# Patient Record
Sex: FEMALE | Race: AMERICAN INDIAN OR ALASKA NATIVE
[De-identification: names, ages, dates, MRNs, and addresses within clinical notes are randomized per-mention and may not be internally consistent; named-entity substitution may affect disease eponyms.]

---

## 2018-07-26 ENCOUNTER — HOSPITAL ENCOUNTER (OUTPATIENT)
Dept: HOSPITAL 5 - SLR | Age: 28
Discharge: HOME | End: 2018-07-26
Attending: OTOLARYNGOLOGY
Payer: MEDICAID

## 2018-07-26 DIAGNOSIS — G47.33: Primary | ICD-10-CM

## 2018-07-26 PROCEDURE — 95810 POLYSOM 6/> YRS 4/> PARAM: CPT

## 2018-09-18 ENCOUNTER — HOSPITAL ENCOUNTER (OUTPATIENT)
Dept: HOSPITAL 5 - SLR | Age: 28
Discharge: HOME | End: 2018-09-18
Attending: OTOLARYNGOLOGY
Payer: MEDICAID

## 2018-09-18 DIAGNOSIS — G47.33: ICD-10-CM

## 2018-09-18 DIAGNOSIS — R40.0: Primary | ICD-10-CM

## 2018-09-18 DIAGNOSIS — R06.83: ICD-10-CM

## 2018-09-18 PROCEDURE — 95811 POLYSOM 6/>YRS CPAP 4/> PARM: CPT

## 2019-03-28 ENCOUNTER — HOSPITAL ENCOUNTER (OUTPATIENT)
Dept: HOSPITAL 5 - TRG | Age: 29
LOS: 1 days | Discharge: HOME | End: 2019-03-29
Attending: OBSTETRICS & GYNECOLOGY
Payer: MEDICAID

## 2019-03-28 DIAGNOSIS — O99.333: ICD-10-CM

## 2019-03-28 DIAGNOSIS — F17.200: ICD-10-CM

## 2019-03-28 DIAGNOSIS — O24.913: ICD-10-CM

## 2019-03-28 DIAGNOSIS — F32.9: ICD-10-CM

## 2019-03-28 DIAGNOSIS — M79.89: ICD-10-CM

## 2019-03-28 DIAGNOSIS — O99.513: ICD-10-CM

## 2019-03-28 DIAGNOSIS — O26.893: Primary | ICD-10-CM

## 2019-03-28 DIAGNOSIS — Z3A.34: ICD-10-CM

## 2019-03-28 DIAGNOSIS — O99.343: ICD-10-CM

## 2019-03-28 DIAGNOSIS — F12.90: ICD-10-CM

## 2019-03-28 DIAGNOSIS — O99.323: ICD-10-CM

## 2019-03-28 LAB
BACTERIA #/AREA URNS HPF: (no result) /HPF
BILIRUB UR QL STRIP: (no result)
BLOOD UR QL VISUAL: (no result)
MUCOUS THREADS #/AREA URNS HPF: (no result) /HPF
PH UR STRIP: 7 [PH] (ref 5–7)
PROT UR STRIP-MCNC: (no result) MG/DL
RBC #/AREA URNS HPF: 2 /HPF (ref 0–6)
UROBILINOGEN UR-MCNC: 2 MG/DL (ref ?–2)
WBC #/AREA URNS HPF: 5 /HPF (ref 0–6)

## 2019-03-28 PROCEDURE — 82962 GLUCOSE BLOOD TEST: CPT

## 2019-03-28 PROCEDURE — 96360 HYDRATION IV INFUSION INIT: CPT

## 2019-03-28 PROCEDURE — 59025 FETAL NON-STRESS TEST: CPT

## 2019-03-28 PROCEDURE — 81001 URINALYSIS AUTO W/SCOPE: CPT

## 2019-03-29 VITALS — DIASTOLIC BLOOD PRESSURE: 85 MMHG | SYSTOLIC BLOOD PRESSURE: 137 MMHG

## 2019-04-15 ENCOUNTER — HOSPITAL ENCOUNTER (INPATIENT)
Dept: HOSPITAL 5 - TRG | Age: 29
LOS: 3 days | Discharge: HOME | End: 2019-04-18
Attending: OBSTETRICS & GYNECOLOGY | Admitting: OBSTETRICS & GYNECOLOGY
Payer: MEDICAID

## 2019-04-15 DIAGNOSIS — Z82.49: ICD-10-CM

## 2019-04-15 DIAGNOSIS — O32.9XX0: ICD-10-CM

## 2019-04-15 DIAGNOSIS — Z79.4: ICD-10-CM

## 2019-04-15 DIAGNOSIS — Z3A.37: ICD-10-CM

## 2019-04-15 DIAGNOSIS — Z23: ICD-10-CM

## 2019-04-15 DIAGNOSIS — E66.01: ICD-10-CM

## 2019-04-15 LAB
ALT SERPL-CCNC: 11 UNITS/L (ref 7–56)
BACTERIA #/AREA URNS HPF: (no result) /HPF
BILIRUB UR QL STRIP: (no result)
BLOOD UR QL VISUAL: (no result)
HCT VFR BLD CALC: 34.3 % (ref 30.3–42.9)
HGB BLD-MCNC: 12.2 GM/DL (ref 10.1–14.3)
MCHC RBC AUTO-ENTMCNC: 36 % (ref 30–34)
MCV RBC AUTO: 88 FL (ref 79–97)
PH UR STRIP: 7 [PH] (ref 5–7)
PLATELET # BLD: 308 K/MM3 (ref 140–440)
PROT UR STRIP-MCNC: (no result) MG/DL
RBC # BLD AUTO: 3.89 M/MM3 (ref 3.65–5.03)
RBC #/AREA URNS HPF: 1 /HPF (ref 0–6)
URATE SERPL-MCNC: 5.6 MG/DL (ref 3.5–7.6)
UROBILINOGEN UR-MCNC: < 2 MG/DL (ref ?–2)
WBC #/AREA URNS HPF: 2 /HPF (ref 0–6)

## 2019-04-15 PROCEDURE — 82962 GLUCOSE BLOOD TEST: CPT

## 2019-04-15 PROCEDURE — 82565 ASSAY OF CREATININE: CPT

## 2019-04-15 PROCEDURE — 88307 TISSUE EXAM BY PATHOLOGIST: CPT

## 2019-04-15 PROCEDURE — 86592 SYPHILIS TEST NON-TREP QUAL: CPT

## 2019-04-15 PROCEDURE — 81001 URINALYSIS AUTO W/SCOPE: CPT

## 2019-04-15 PROCEDURE — 85014 HEMATOCRIT: CPT

## 2019-04-15 PROCEDURE — 90707 MMR VACCINE SC: CPT

## 2019-04-15 PROCEDURE — 84450 TRANSFERASE (AST) (SGOT): CPT

## 2019-04-15 PROCEDURE — 86850 RBC ANTIBODY SCREEN: CPT

## 2019-04-15 PROCEDURE — 86901 BLOOD TYPING SEROLOGIC RH(D): CPT

## 2019-04-15 PROCEDURE — 85018 HEMOGLOBIN: CPT

## 2019-04-15 PROCEDURE — 86900 BLOOD TYPING SEROLOGIC ABO: CPT

## 2019-04-15 PROCEDURE — 83735 ASSAY OF MAGNESIUM: CPT

## 2019-04-15 PROCEDURE — 84460 ALANINE AMINO (ALT) (SGPT): CPT

## 2019-04-15 PROCEDURE — 90715 TDAP VACCINE 7 YRS/> IM: CPT

## 2019-04-15 PROCEDURE — C1765 ADHESION BARRIER: HCPCS

## 2019-04-15 PROCEDURE — 83615 LACTATE (LD) (LDH) ENZYME: CPT

## 2019-04-15 PROCEDURE — 84550 ASSAY OF BLOOD/URIC ACID: CPT

## 2019-04-15 PROCEDURE — 90472 IMMUNIZATION ADMIN EACH ADD: CPT

## 2019-04-15 PROCEDURE — 36415 COLL VENOUS BLD VENIPUNCTURE: CPT

## 2019-04-15 PROCEDURE — 85027 COMPLETE CBC AUTOMATED: CPT

## 2019-04-15 NOTE — PROCEDURE NOTE
OB Delivery Note





- Delivery


Date of Delivery: 04/15/19


Surgeon: GAYLA GAYTAN


Estimated blood loss: 1000cc





-  Section


Preop diagnosis: breech, other (Severe Preeclampsia )


Postop diagnosis: same


 section procedure:  section, primary low transverse


Disposition: PACU


Complications: none


Narrative: 





Please see operative report.





- Infant


  ** A


Apgar at 1 minute: 8


Apgar at 5 minutes: 9


Infant Gender: Female (3585g (7lb 14 oz) @ 2210 pm)

## 2019-04-15 NOTE — HISTORY AND PHYSICAL REPORT
History of Present Illness


Date of examination: 04/15/19


Chief complaint: 





sent from [facility] clinic for delivery 


History of present illness: 





Pt is a 28 year old -American female primigravida KACEY  19 at 37w0d 

presents from maternal fetal medicine clinic with elevated blood pressures with 

instruction for delivery. The patient reports headaches for the past few days 

but denies blurry vision. She has had prenatal care at [facility] 

since 6 wks complicated by Type II Diabetes Mellitus on Insulin ([id number] AM, 22 R

dinner, 20 NPH QHS), Sleep apnea, Morbid Obesity, genital herpes without lesion 

or prodrome, malpresentation noted on multiple ultrasounds and GBS positive 

status.  





Past History


Past Medical History: diabetes, other (morbid obesity, obstructive sleep apnea )


Past Surgical History: no surgical history


GYN History: herpes


Family/Genetic History: hypertension


Social history: no significant social history





- Obstetrical History


Expected Date of Delivery: 19


Actual Gestation: 37 Week(s) 0 Day(s) 


: 1





Medications and Allergies


                                    Allergies











Allergy/AdvReac Type Severity Reaction Status Date / Time


 


No Known Allergies Allergy   Verified 19 22:48











Active Meds: 


Active Medications





Citric Acid/Sodium Citrate (Bicitra)  30 ml PO ONCE ONE


   Stop: 04/15/19 19:47


Famotidine (Pepcid)  20 mg IV ONCE ONE


   Stop: 04/15/19 19:47


Cefazolin Sodium 3 gm/ Sodium (Chloride)  100 mls @ 100 mls/30 min IV PREOP NR; 

Protocol


Lactated Ringer's (Lactated Ringers)  1,000 mls @ 2,250 mls/hr IV PREOP BRISEIDA


   Stop: 19 20:27


Oxytocin/Sodium Chloride (Pitocin/Ns 20 Unit/1000ml Drip)  20 units in 1,000 mls

@ 0 mls/hr IV TITR BRISEIDA


Metoclopramide HCl (Reglan)  10 mg IV ONCE ONE


   Stop: 04/15/19 19:47











Review of Systems


All systems: negative





- Vital Signs


Vital signs: 


                                   Vital Signs











Pulse BP


 


 115 H  197/112 


 


 04/15/19 19:21  04/15/19 19:21








                                        











Temp Pulse Resp BP Pulse Ox


 


 98.1 F   85   18   144/68    


 


 04/15/19 19:32  04/15/19 19:42  04/15/19 19:32  04/15/19 19:42   














- Physical Exam


Abdomen: Positive: soft (obese, gravid )


Uterus: Positive: enlarged (gravid)


Extremities: Positive: edema





- Obstetrical


FHR: auscultation normal


Uterine Contraction Monitor Mode: External


Uterine Contraction Pattern: Absent





Results


Result Diagrams: 


                                 04/15/19 20:12





                                 04/15/19 20:12


All other labs normal.








Assessment and Plan





A:  IUP at 37w0d


     Severe Preeclampsia


     Malpresentation


     Insulin Dependent Diabetes Mellitus 


     Morbid Obesity


     GBS positive 


     Genital Herpes without lesion or prodrome





P: Proceed with primary  section and other indicated procedures.

## 2019-04-15 NOTE — OPERATIVE REPORT
Operative Report


Operative Report: 





Date of procedure: April 15, 2019


Preoperative diagnosis: 1)IUP at 37w0d 2) Severe Preeclampsia 3) Malpresentation

4) Morbid Obesity 5) Diabetes Mellitus 


Postoperative diagnosis: Same


Procedure: Primary low transverse  section 


Surgeon: Sylwia Eric M.D.


Anesthesia: Regional


Findings:


1) Viable female , Apgars 8 and 9, weight 3585g, (7 lb 14 oz) in complete

breech presentation. Meconium at time of delivery


2) Normal-appearing uterus ovaries and tubes


Estimated blood loss: 1000 mL 


IV fluids:1900 mL 


Urine output: 150 mL, clear at the end of the procedure 


Drains: Pitts to gravity


Specimens: Placenta to pathology 


Complications: None. Counts correct x 3 


Disposition: Stable to PACU





Indication for procedure:


Patient is a 28-year-old -American female primigravida at 37 weeks 0 days

who presents with severe preeclampsia and  malpresentation.  The decision was 

made to proceed with  delivery.





Operation in detail: After the risks, benefits, alternatives and complications 

were explained to the patient she gave informed consent for the procedure.  She 

was subsequently taken to the operating room where regional anesthesia was noted

to be adequate.  She was subsequently placed in the dorsal supine position with 

leftward tilt and prepped and draped in a normal sterile fashion.  Fetal heart 

tones were noted to be in the 150s prior to incision.  A timeout was performed.





A Pfannenstiel skin incision was made with the knife and carried down to the 

layer of the fascia with the Bovie.  The fascia was incised in the midline and 

the fascial incision was extended bilaterally with the Bovie.  Attention was 

then turned to the superior aspect of the incision which was grasped with two 

Kochers, tented up, and dissected off the rectus muscles.  Attention was then 

turned to the inferior aspect of the incision which was grasped with two 

Kochers, tented up and dissected off the rectus muscles.  The rectus muscles 

were then  in the midline and partially transected for adequate 

visualization.  The peritoneum was then entered bluntly. The peritoneal incision

was extended with good visualization of the bladder.  The peritoneal incision 

was then stretched.  An Martir self-retaining retractor was placed for 

visualization.  The bladder blade was placed.





The vesicouterine peritoneum was grasped with smooth pickups and incised with 

Metzenbaum scissors.  Metzenbaum scissors were used to extend the incision 

bilaterally.  The bladder flap was then created digitally and the bladder blade 

was replaced.  A transverse incision was made in the lower uterine segment with 

a knife and extended bilaterally with the bandage scissors.  The fetal buttocks 

were delivered, followed by the fetal legs up to the level of the sacrum, then 

the fetus was covered with a blue towel. Next the torso was delivered, then each

arm along the axis of the body and finally the fetal head.  was bulb 

suctioned at delivery.  The cord was clamped and cut and the  was handed 

to NICU staff in attendance.  Cord blood was collected.  The placenta was then 

delivered manually.  





The uterus was then cleared of all clots and debris.  The hysterotomy was then 

reapproximated with 0 Vicryl in a running locked fashion.  A second layer of the

same suture was used in imbricating fashion.  The hysterotomy was inspected and 

hemostasis was noted.  The Martir self-retaining retractor was removed.  The 

gutters were irrigated and cleared of all clots and debris.  The hysterotomy was

again inspected and noted to be hemostatic. Surgicel was placed over the 

hysterotomy. Interceed was placed over the anterior surface of the uterus. The 

peritoneum was reapproximated with 2-0 Vicryl in a running fashion incorporating

the rectus muscles.  The fascia was reapproximated with 0 Vicryl in a running 

fashion.  The subcutaneous tissue was reapproximated with 3-0 Vicryl in a 

running fashion.  The skin was reapproximated with 4-0 Vicryl in a subcuticular 

fashion.  The incision was then covered with steri strips and a pressure 

dressing. The procedure was then ended.  The patient tolerated the procedure 

well and was taken to the PACU in stable condition.  All instrument, lap, and 

needle counts were correct 3.

## 2019-04-15 NOTE — POST ANESTHESIA EVALUATION
- Post Anesthesia Evaluation


Patient Participated: Yes


Airway Patent: Yes


Stable Respiratory Function: Yes


Nausea/Vomiting: No


Temp > 96.8F: Yes


Pain Manageable: Yes


Adequeate Hydration: Yes


Anesthesia Complications: No


Block Receding Appropriately: Yes


Patient on Ventilator: No


Other Comments: Report to PACU HONG Martinez. Explained pt history of JOSHUA and 

necessity of caution with opioid pain medications. Order to be placed for 

continuous SaO2 on floor if not already placed by OBGYN.  Explained spinal 

regression to patient who verbalizes understanding. VSS.

## 2019-04-16 LAB
HCT VFR BLD CALC: 34.6 % (ref 30.3–42.9)
HGB BLD-MCNC: 11.6 GM/DL (ref 10.1–14.3)

## 2019-04-16 PROCEDURE — 3E0234Z INTRODUCTION OF SERUM, TOXOID AND VACCINE INTO MUSCLE, PERCUTANEOUS APPROACH: ICD-10-PCS | Performed by: OBSTETRICS & GYNECOLOGY

## 2019-04-16 RX ADMIN — KETOROLAC TROMETHAMINE PRN MG: 30 INJECTION, SOLUTION INTRAMUSCULAR at 08:00

## 2019-04-16 RX ADMIN — OXYCODONE AND ACETAMINOPHEN PRN TAB: 5; 325 TABLET ORAL at 20:54

## 2019-04-16 RX ADMIN — KETOROLAC TROMETHAMINE PRN MG: 30 INJECTION, SOLUTION INTRAMUSCULAR at 00:56

## 2019-04-16 RX ADMIN — OXYCODONE AND ACETAMINOPHEN PRN TAB: 5; 325 TABLET ORAL at 08:13

## 2019-04-16 RX ADMIN — KETOROLAC TROMETHAMINE PRN MG: 30 INJECTION, SOLUTION INTRAMUSCULAR at 13:33

## 2019-04-16 RX ADMIN — OXYCODONE AND ACETAMINOPHEN PRN TAB: 5; 325 TABLET ORAL at 13:34

## 2019-04-16 RX ADMIN — KETOROLAC TROMETHAMINE PRN MG: 30 INJECTION, SOLUTION INTRAMUSCULAR at 01:14

## 2019-04-16 NOTE — PROGRESS NOTE
Assessment and Plan





A: POD#1 s/p primary  section at term for severe preeclampsia and 

malpresentation


    Diabetes Mellitus 


    Morbid Obesity





P:Optimize pain regimen


   IV Magnesium x 24 hrs 


   Closely monitor maternal and fetal status. 


   





Subjective





- Subjective


Date of service: 19


Principal diagnosis: s/p primary  secondary to severe PIH and breech 

presentation 


Interval history: 





Pt reports suboptimal pain control overnight. Otherwise no complaints.  


Patient reports: appetite normal, pain poorly controlled, no voiding normally 

(ramirez in place ), no ambulating normally (SCDs in place )


[place]: doing well, in NICU





Objective





- Vital Signs


Latest vital signs: 


                                   Vital Signs











  Temp Pulse Resp BP BP Pulse Ox


 


 19 08:05   94 H     98


 


 19 08:04   90   140/82  


 


 19 08:00   93 H     96


 


 19 07:55   98 H     98


 


 19 07:53   101 H     94


 


 19 07:50   90     97


 


 19 07:45   90     97


 


 19 07:44   95 H     94


 


 19 07:40   91 H     96


 


 19 07:35   86     95


 


 19 07:34   85   141/88  


 


 19 07:30   99 H     96


 


 19 07:25   95 H     95


 


 19 07:20   83     96


 


 19 07:18   94 H     94


 


 19 07:15   94 H     95


 


 19 07:10   88     96


 


 19 07:06   95 H     93


 


 19 07:05   96 H     95


 


 19 07:04   93 H   122/58  


 


 19 07:01   94 H     94


 


 19 07:00   92 H     95


 


 19 06:56   91 H     94


 


 19 06:55   92 H     96


 


 19 06:50   97 H     95


 


 19 06:49   93 H     93


 


 19 06:45   89     95


 


 19 06:40   106 H     96


 


 19 06:36   94 H     94


 


 19 06:35   93 H   103/51   94


 


 19 06:30   98 H     98


 


 19 06:25   106 H     97


 


 19 06:20   107 H     96


 


 19 06:18   109 H     94


 


 19 06:15   104 H     96


 


 19 06:10   122 H     97


 


 19 06:05   98 H     95


 


 19 06:04   106 H   139/73  


 


 19 06:00   110 H     96


 


 19 05:55   123 H     96


 


 19 05:50   107 H     97


 


 19 05:48   118 H     93


 


 19 05:45   107 H     96


 


 19 05:40   98 H     96


 


 19 05:35   106 H     96


 


 19 05:34   103 H   143/79  


 


 19 05:30   116 H     95


 


 19 05:25   105 H     96


 


 19 05:20   108 H     90


 


 19 05:15   105 H     95


 


 19 05:10   121 H     93


 


 19 05:05   119 H     95


 


 19 05:04   106 H   154/70   94


 


 19 05:00   31 L     0 L


 


 19 04:57   104 H     94


 


 19 04:55   104 H     94


 


 19 04:52   100 H     94


 


 19 04:50   108 H     97


 


 19 04:45   107 H     96


 


 19 04:40   108 H     97


 


 19 04:35   100 H     95


 


 19 04:34   93 H   140/84  


 


 19 04:30   96 H     97


 


 19 04:25   110 H     98


 


 19 04:23   103 H     88


 


 19 04:17   94 H     98


 


 19 04:12   93 H     98


 


 19 04:07   95 H     94


 


 19 04:04   95 H   137/93  


 


 19 04:02   98 H     95


 


 19 03:57   94 H     95


 


 19 03:52   91 H     95


 


 19 03:51   90     94


 


 19 03:47   98 H     96


 


 19 03:42   103 H     97


 


 19 03:35   100 H   155/107  


 


 19 03:32   98 H     90


 


 19 03:31   88     98


 


 19 03:26   94 H     98


 


 19 03:21   102 H     99


 


 19 03:17   89     94


 


 19 03:16   101 H     99


 


 19 03:11   90     98


 


 19 03:06   95 H     99


 


 19 03:04   89   138/79  


 


 19 03:01   89     98


 


 19 02:56   93 H     98


 


 19 02:51   94 H     99


 


 19 02:46   89     99


 


 19 02:41   95 H     98


 


 19 02:36   98 H     99


 


 19 02:34   89   147/67  


 


 19 02:31   93 H     99


 


 19 02:26   93 H     98


 


 19 02:21   95 H     99


 


 19 02:16   85     98


 


 19 02:11   80     98


 


 19 02:06   86     99


 


 19 02:04   82   148/79  


 


 19 02:01   83     98


 


 19 01:56   76     98


 


 19 01:51   83     98


 


 19 01:46   81     97


 


 19 01:41   73     99


 


 19 01:36   74     98


 


 19 01:34   79   133/66  


 


 19 01:31   76     99


 


 19 01:26   86     98


 


 19 01:21   78     98


 


 19 01:16   76     99


 


 19 01:11   82     99


 


 19 01:06   79     99


 


 19 01:04   74   144/97  


 


 19 01:01   80     99


 


 19 01:00  97.7 F   18   


 


 19 00:56   83     98


 


 19 00:51   83     98


 


 19 00:46   83     99


 


 19 00:41   86     100


 


 19 00:36   120 H     100


 


 19 00:34   70   143/73  


 


 19 00:31   72     97


 


 19 00:10  97.7 F  75  23  151/102   99


 


 04/15/19 23:55   84  18  141/84   98


 


 04/15/19 23:40   87  17  142/81   97


 


 04/15/19 23:25   84  22  148/62   98


 


 04/15/19 23:20   81  19  127/70   98


 


 04/15/19 23:15   85  22  129/80   97


 


 04/15/19 23:10  97.1 F L  84  21  124/71   98


 


 04/15/19 20:44   93 H   153/87  


 


 04/15/19 19:51   85   134/68  


 


 04/15/19 19:42   85   144/68  144/68 


 


 04/15/19 19:32  98.1 F  83  18  186/110  186/110 


 


 04/15/19 19:21   115 H   197/112  197/112 








                                Intake and Output











 04/15/19 04/16/19 04/16/19





 22:59 06:59 14:59


 


Output Total  100 


 


Balance  -100 


 


Output:   


 


  Urine  100 


 


    Indwelling Catheter  100 


 


Other:   


 


  Total, Output Amount  100 


 


  Weight 140.16 kg  


 


  Estimated Blood Loss  1,000 














- Exam


Breasts: Present: deferred


Cardiovascular: Present: Regular rate


Lungs: Present: Clear to auscultation


Abdomen: Present: soft (obese ), abnormal bowel sounds (hypoactive )


Uterus: Present: fundal height below umbilicus


Extremities: Present: edema (trace)


Incision: Present: dressed





- Labs


Labs: 


                              Abnormal lab results











  04/15/19 04/15/19 04/15/19 Range/Units





  20:06 20:12 20:12 


 


WBC   11.7 H   (4.5-11.0)  K/mm3


 


MCHC   36 H   (30-34)  %


 


Creatinine    0.4 L  (0.7-1.2)  mg/dL


 


POC Glucose  64 L    ()  


 


Magnesium     (1.7-2.3)  mg/dL


 


Lactate Dehydrogenase    204 H  ()  units/L














  19 Range/Units





  06:21 


 


WBC   (4.5-11.0)  K/mm3


 


MCHC   (30-34)  %


 


Creatinine   (0.7-1.2)  mg/dL


 


POC Glucose   ()  


 


Magnesium  3.10 H  (1.7-2.3)  mg/dL


 


Lactate Dehydrogenase   ()  units/L

## 2019-04-17 RX ADMIN — IBUPROFEN PRN MG: 800 TABLET, FILM COATED ORAL at 20:11

## 2019-04-17 RX ADMIN — CEFAZOLIN SCH MLS/HR: 330 INJECTION, POWDER, FOR SOLUTION INTRAMUSCULAR; INTRAVENOUS at 22:30

## 2019-04-17 RX ADMIN — DIPHENHYDRAMINE HYDROCHLORIDE PRN MG: 25 CAPSULE ORAL at 22:31

## 2019-04-17 RX ADMIN — OXYCODONE AND ACETAMINOPHEN PRN TAB: 5; 325 TABLET ORAL at 14:31

## 2019-04-17 RX ADMIN — OXYCODONE AND ACETAMINOPHEN PRN TAB: 5; 325 TABLET ORAL at 20:11

## 2019-04-17 RX ADMIN — CEFAZOLIN SCH MLS/HR: 330 INJECTION, POWDER, FOR SOLUTION INTRAMUSCULAR; INTRAVENOUS at 14:39

## 2019-04-17 RX ADMIN — MAGNESIUM HYDROXIDE SCH: 400 SUSPENSION ORAL at 21:33

## 2019-04-17 RX ADMIN — METRONIDAZOLE SCH MG: 500 TABLET ORAL at 16:45

## 2019-04-17 RX ADMIN — MAGNESIUM HYDROXIDE SCH ML: 400 SUSPENSION ORAL at 08:35

## 2019-04-17 RX ADMIN — OXYCODONE AND ACETAMINOPHEN PRN TAB: 5; 325 TABLET ORAL at 00:57

## 2019-04-17 RX ADMIN — IBUPROFEN PRN MG: 800 TABLET, FILM COATED ORAL at 14:32

## 2019-04-17 RX ADMIN — OXYCODONE AND ACETAMINOPHEN PRN TAB: 5; 325 TABLET ORAL at 08:39

## 2019-04-17 RX ADMIN — IBUPROFEN PRN MG: 800 TABLET, FILM COATED ORAL at 05:57

## 2019-04-17 RX ADMIN — DIPHENHYDRAMINE HYDROCHLORIDE PRN MG: 25 CAPSULE ORAL at 16:24

## 2019-04-17 RX ADMIN — IBUPROFEN PRN MG: 800 TABLET, FILM COATED ORAL at 00:29

## 2019-04-17 RX ADMIN — MAGNESIUM HYDROXIDE SCH: 400 SUSPENSION ORAL at 14:30

## 2019-04-17 NOTE — PROGRESS NOTE
Assessment and Plan





A/P 


POD2 s/p csec for severe pree


s/p mag


continue present mgt 





Subjective





- Subjective


Date of service: 19


Principal diagnosis: s/p primary  secondary to severe PIH and breech 

presentation 


Patient reports: appetite normal, voiding normally, pain well controlled, 

flatus, ambulating normally


[place]: doing well





Objective





- Vital Signs


Latest vital signs: 


                                   Vital Signs











  Temp Pulse Resp BP Pulse Ox


 


 19 04:00  98.4 F  89  20  123/73  94


 


 19 00:34  97.5 F L  101 H  20  138/77  97


 


 19 00:29    20  


 


 19 00:10  98.4 F   20  


 


 19 00:05   96 H   134/72 


 


 19 23:35   111 H   136/79 


 


 19 23:04   102 H   133/60 


 


 19 22:34   104 H   121/55 


 


 19 22:05   116 H   121/66 


 


 19 21:35   103 H   135/75 


 


 19 21:04   93 H   150/85 


 


 19 20:54    20  


 


 19 20:43  98.2 F  97 H  20  160/79 


 


 19 20:34   99 H   144/79 


 


 19 20:04   100 H   131/93 


 


 19 19:34   102 H   159/95 


 


 19 19:04   100 H   134/66 


 


 19 18:35   101 H   149/90 


 


 19 18:04   98 H   124/60 


 


 19 17:35   101 H   128/69 


 


 19 17:04   100 H   142/78 


 


 19 16:56   110 H    86


 


 19 16:51   108 H    89


 


 19 16:45   103 H    92


 


 19 16:41   105 H    95


 


 19 16:39   99 H    94


 


 19 16:36   96 H    97


 


 19 16:35   105 H   119/88 


 


 19 16:16   97 H    96


 


 19 16:04   95 H   147/88 


 


 19 16:03   97 H    98


 


 19 15:57   89    92


 


 19 15:52   92 H    94


 


 19 15:51   92 H    92


 


 19 15:48   91 H    93


 


 19 15:46   91 H    93


 


 19 15:43   83    94


 


 19 15:41   94 H    93


 


 19 15:38   81    96


 


 19 15:35   93 H   153/85  94


 


 19 15:33   93 H    93


 


 19 15:30   92 H    94


 


 19 15:28   94 H    96


 


 19 15:24   94 H    92


 


 19 15:23   96 H    94


 


 19 15:19   95 H    92


 


 19 15:18   97 H    93


 


 19 15:14   91 H    93


 


 19 15:13   95 H    95


 


 19 15:09   95 H    93


 


 19 15:08   99 H    94


 


 19 15:04   95 H   140/85 


 


 19 15:03   99 H    93


 


 19 14:58   95 H    93


 


 19 14:53   100 H    94


 


 19 14:48   101 H    96


 


 19 14:47   101 H    94


 


 19 14:43   96 H    93


 


 19 14:42   96 H    94


 


 19 14:38   95 H    95


 


 19 14:37   97 H    94


 


 19 14:35   97 H   165/83 


 


 19 14:33   94 H    95


 


 19 14:31   95 H    93


 


 19 14:28   96 H    93


 


 19 14:26   100 H    94


 


 19 14:23   102 H    99


 


 19 14:18   92 H    98


 


 19 14:13   90    99


 


 19 14:07   99 H    98


 


 19 14:04   90   140/88 


 


 19 14:03   92 H    95


 


 19 14:01   94 H    94


 


 19 13:58   93 H    95


 


 19 13:55   95 H    94


 


 19 13:53   91 H    95


 


 19 13:49   93 H    94


 


 19 13:48   90    96


 


 19 13:43   103 H    98


 


 19 13:38   103 H    98


 


 19 13:34   101 H   139/88 


 


 19 13:33   98 H    97


 


 19 13:28   101 H    97


 


 19 13:23   90    97


 


 19 13:18   104 H    97


 


 19 13:04   102 H   125/66 


 


 19 13:01   101 H    97


 


 19 12:56   106 H    97


 


 19 12:34   94 H   143/71 


 


 19 12:04   112 H   111/69 


 


 19 11:54   107 H    95


 


 19 11:53   96 H    94


 


 19 11:49   99 H    94


 


 19 11:44   99 H    94


 


 19 11:39   108 H    94


 


 19 11:38   103 H    95


 


 19 11:34   102 H   128/59  99


 


 19 11:08   112 H    94


 


 19 11:05   116 H    91


 


 19 11:02   101 H    97


 


 19 10:57   105 H    97


 


 19 10:52   107 H    97


 


 19 10:47   112 H    98


 


 19 10:42   100 H    97


 


 19 10:37   103 H    97


 


 19 10:34   115 H   120/60 


 


 19 10:32   116 H    97


 


 19 10:27   104 H    97


 


 19 10:14   93 H    96


 


 19 10:05   105 H   114/53 


 


 19 09:34   103 H   126/65 


 


 19 09:04   96 H   145/79 


 


 19 08:34   100 H   145/71 


 


 19 08:25   99 H    98


 


 19 08:20   89    97


 


 19 08:15   96 H    96


 


 19 08:10   94 H    98


 


 19 08:05   94 H    98


 


 19 08:04   90   140/82 


 


 19 08:00  98.1 F  93 H  18   96








                                Intake and Output











 19





 15:59 23:59 07:59


 


Intake Total   480


 


Output Total 1685 301 8198


 


Balance -1250 -600 -620


 


Intake:   


 


  Intake, Free Water   480


 


Output:   


 


  Urine [id number]


 


    Indwelling Catheter 1250 600 


 


    Void   1100


 


Other:   


 


  Total, Output Amount 400 600 600














- Exam


Breasts: Present: normal


Cardiovascular: Present: Regular rate, Normal S1


Lungs: Present: Clear to auscultation, Normal air movement


Abdomen: Present: normal appearance, soft, normal bowel sounds.  Absent: 

distention, tenderness, guarding


Vulva: both: normal


Uterus: Present: normal, firm.  Absent: bogginess


Extremities: Present: normal


Deep Tendon Reflex Grade: Normal +2


Incision: Present: normal, dry, intact





- Labs


Labs: 


                              Abnormal lab results











  19 Range/Units





  11:03 13:36 17:47 


 


POC Glucose   119 H   ()  


 


Magnesium  2.90 H   3.10 H  (1.7-2.3)  mg/dL














  19 Range/Units





  01:12 06:32 


 


POC Glucose  116 H  108 H  ()  


 


Magnesium    (1.7-2.3)  mg/dL

## 2019-04-18 VITALS — DIASTOLIC BLOOD PRESSURE: 70 MMHG | SYSTOLIC BLOOD PRESSURE: 127 MMHG

## 2019-04-18 RX ADMIN — IBUPROFEN PRN MG: 800 TABLET, FILM COATED ORAL at 04:28

## 2019-04-18 RX ADMIN — MAGNESIUM HYDROXIDE SCH ML: 400 SUSPENSION ORAL at 10:23

## 2019-04-18 RX ADMIN — OXYCODONE AND ACETAMINOPHEN PRN TAB: 5; 325 TABLET ORAL at 04:27

## 2019-04-18 RX ADMIN — METRONIDAZOLE SCH MG: 500 TABLET ORAL at 04:27

## 2019-04-18 RX ADMIN — MAGNESIUM HYDROXIDE SCH: 400 SUSPENSION ORAL at 02:05

## 2019-04-18 RX ADMIN — MAGNESIUM HYDROXIDE SCH: 400 SUSPENSION ORAL at 14:24

## 2019-04-18 RX ADMIN — IBUPROFEN PRN MG: 800 TABLET, FILM COATED ORAL at 10:24

## 2019-04-18 RX ADMIN — METRONIDAZOLE SCH MG: 500 TABLET ORAL at 10:23

## 2019-04-18 NOTE — DISCHARGE SUMMARY
Providers





- Providers


Date of Admission: 


04/15/19 20:00





Date of discharge: 19


Attending physician: 


GAYLA GAYTAN





Primary care physician: 


GAYLA GAYTAN








Hospitalization


Reason for admission:  section


Delivery: 


Procedure:  section


Episiotomy: none


Laceration: none


Incision: normal, dry, intact


Other postpartum procedures: none


Postpartum complications: none


Discharge diagnosis: IUP at term delivered


 baby: female


Hospital course: 





patient admitted and endures a csec for breech, severee pree. she had mag for 24

hrs. BP normal range. f/u in 1 weeks 


Condition at discharge: Good


Disposition: DC-01 TO HOME OR SELFCARE





Plan





- Discharge Medications


Prescriptions: 


Ferrous Sulfate [Feosol 325 MG tab] 325 mg PO BID #60 tablet


Ibuprofen [Motrin] 800 mg PO Q8HR PRN #30 tablet


 PRN Reason: Pain, Moderate (4-6)


oxyCODONE /ACETAMINOPHEN [Percocet 5/325] 1 tab PO Q6HR PRN #40 tablet


 PRN Reason: Pain





- Provider Discharge Summary


Activity: routine, no sex for 6 weeks, no strenuous exercise


Diet: routine


Instructions: routine


Additional instructions: 


[]  Smoking cessation referral if applicable(refer to patient education folder 

for contact #)


[]  Refer to [facility]'s [facility] Booklet








Call your doctor immediately for:


* Fever > 100.5


* Heavy vaginal bleeding ( >1 pad per hour)


* Severe persistent headache


* Shortness of breath


* Reddened, hot, painful area to leg or breast


* Drainage or odor from incision.





* Keep incision clean and dry at all times and follow doctor's instructions 

regarding bathing/showering











- Follow up plan


Follow up: 


GAYLA GAYTAN MD [Primary Care Provider] - 7 Days

## 2019-04-18 NOTE — PROGRESS NOTE
Assessment and Plan





A/P 


POD2 s/p csec for severe pree


s/p mag


BP stable 130-140/90


d/c home with f/u next week for BP check .  





Subjective





- Subjective


Date of service: 19


Principal diagnosis: s/p primary  secondary to severe PIH and breech 

presentation 


Patient reports: appetite normal, voiding normally, pain well controlled, 

flatus, ambulating normally


: doing well





Objective





- Vital Signs


Latest vital signs: 


                                   Vital Signs











  Temp Pulse Resp BP BP Pulse Ox


 


 19 07:29  97.9 F  76  20  148/83   96


 


 19 00:00  98.8 F  102 H  20   138/79  98


 


 19 16:10  97.8 F  102 H  18   142/90  95


 


 19 14:32    20   


 


 19 14:31    20   








                                Intake and Output











 19





 23:59 07:59 15:59


 


Intake Total 240  120


 


Balance 240  120


 


Intake:   


 


  Oral 240  120


 


Other:   


 


  Total, Intake Amount 240  120


 


  # Voids   


 


    Void 1 1 1














- Exam


Breasts: Present: normal


Cardiovascular: Present: Regular rate, Normal S1


Lungs: Present: Clear to auscultation, Normal air movement


Abdomen: Present: normal appearance, soft, normal bowel sounds.  Absent: 

distention, tenderness, guarding


Uterus: Present: normal, firm, fundal height below umbilicus.  Absent: 

bogginess, tenderness


Extremities: Present: normal


Deep Tendon Reflex Grade: Normal +2


Incision: Present: normal, intact





- Labs


Labs: 


                              Abnormal lab results











  19 Range/Units





  20:14 04:38 


 


POC Glucose  120 H  107 H  ()

## 2021-05-11 ENCOUNTER — HOSPITAL ENCOUNTER (OUTPATIENT)
Dept: HOSPITAL 5 - TRG | Age: 31
Discharge: HOME | End: 2021-05-11
Attending: OBSTETRICS & GYNECOLOGY
Payer: MEDICAID

## 2021-05-11 VITALS — SYSTOLIC BLOOD PRESSURE: 137 MMHG | DIASTOLIC BLOOD PRESSURE: 80 MMHG

## 2021-05-11 DIAGNOSIS — O13.3: Primary | ICD-10-CM

## 2021-05-11 DIAGNOSIS — Z3A.35: ICD-10-CM

## 2021-05-11 LAB
ALT SERPL-CCNC: 11 UNITS/L (ref 7–56)
BACTERIA #/AREA URNS HPF: (no result) /HPF
BILIRUB UR QL STRIP: (no result)
BLOOD UR QL VISUAL: (no result)
HCT VFR BLD CALC: 41 % (ref 30.3–42.9)
HGB BLD-MCNC: 14.1 GM/DL (ref 10.1–14.3)
MCHC RBC AUTO-ENTMCNC: 34 % (ref 30–34)
MCV RBC AUTO: 91 FL (ref 79–97)
MUCOUS THREADS #/AREA URNS HPF: (no result) /HPF
PH UR STRIP: 6 [PH] (ref 5–7)
PLATELET # BLD: 299 K/MM3 (ref 140–440)
RBC # BLD AUTO: 4.51 M/MM3 (ref 3.65–5.03)
RBC #/AREA URNS HPF: 7 /HPF (ref 0–6)
URATE SERPL-MCNC: 6.4 MG/DL (ref 3.5–7.6)
UROBILINOGEN UR-MCNC: < 2 MG/DL (ref ?–2)
WBC #/AREA URNS HPF: 9 /HPF (ref 0–6)

## 2021-05-11 PROCEDURE — 84550 ASSAY OF BLOOD/URIC ACID: CPT

## 2021-05-11 PROCEDURE — 87086 URINE CULTURE/COLONY COUNT: CPT

## 2021-05-11 PROCEDURE — 82565 ASSAY OF CREATININE: CPT

## 2021-05-11 PROCEDURE — 81001 URINALYSIS AUTO W/SCOPE: CPT

## 2021-05-11 PROCEDURE — 84450 TRANSFERASE (AST) (SGOT): CPT

## 2021-05-11 PROCEDURE — 36415 COLL VENOUS BLD VENIPUNCTURE: CPT

## 2021-05-11 PROCEDURE — 83615 LACTATE (LD) (LDH) ENZYME: CPT

## 2021-05-11 PROCEDURE — 84460 ALANINE AMINO (ALT) (SGPT): CPT

## 2021-05-11 PROCEDURE — 85027 COMPLETE CBC AUTOMATED: CPT

## 2021-05-17 ENCOUNTER — HOSPITAL ENCOUNTER (INPATIENT)
Dept: HOSPITAL 5 - LD | Age: 31
LOS: 4 days | Discharge: HOME | End: 2021-05-21
Attending: OBSTETRICS & GYNECOLOGY | Admitting: OBSTETRICS & GYNECOLOGY
Payer: MEDICAID

## 2021-05-17 DIAGNOSIS — Z20.822: ICD-10-CM

## 2021-05-17 DIAGNOSIS — Z87.01: ICD-10-CM

## 2021-05-17 DIAGNOSIS — E11.8: ICD-10-CM

## 2021-05-17 DIAGNOSIS — E66.01: ICD-10-CM

## 2021-05-17 DIAGNOSIS — A60.09: ICD-10-CM

## 2021-05-17 DIAGNOSIS — Z82.49: ICD-10-CM

## 2021-05-17 DIAGNOSIS — O34.211: Primary | ICD-10-CM

## 2021-05-17 DIAGNOSIS — Z3A.36: ICD-10-CM

## 2021-05-17 DIAGNOSIS — F32.9: ICD-10-CM

## 2021-05-17 DIAGNOSIS — Z79.4: ICD-10-CM

## 2021-05-17 LAB
ALT SERPL-CCNC: 14 UNITS/L (ref 7–56)
BASOPHILS # (AUTO): 0 K/MM3 (ref 0–0.1)
BASOPHILS NFR BLD AUTO: 0.3 % (ref 0–1.8)
BILIRUB UR QL STRIP: (no result)
BLOOD UR QL VISUAL: (no result)
EOSINOPHIL # BLD AUTO: 0.1 K/MM3 (ref 0–0.4)
EOSINOPHIL NFR BLD AUTO: 1.1 % (ref 0–4.3)
HCT VFR BLD CALC: 42.8 % (ref 30.3–42.9)
HGB BLD-MCNC: 14.7 GM/DL (ref 10.1–14.3)
LYMPHOCYTES # BLD AUTO: 3.2 K/MM3 (ref 1.2–5.4)
LYMPHOCYTES NFR BLD AUTO: 33.6 % (ref 13.4–35)
MCHC RBC AUTO-ENTMCNC: 34 % (ref 30–34)
MCV RBC AUTO: 91 FL (ref 79–97)
MONOCYTES # (AUTO): 0.8 K/MM3 (ref 0–0.8)
MONOCYTES % (AUTO): 8.1 % (ref 0–7.3)
MUCOUS THREADS #/AREA URNS HPF: (no result) /HPF
PH UR STRIP: 6 [PH] (ref 5–7)
PLATELET # BLD: 314 K/MM3 (ref 140–440)
PROT UR STRIP-MCNC: (no result) MG/DL
RBC # BLD AUTO: 4.74 M/MM3 (ref 3.65–5.03)
RBC #/AREA URNS HPF: 1 /HPF (ref 0–6)
URATE SERPL-MCNC: 6 MG/DL (ref 3.5–7.6)
UROBILINOGEN UR-MCNC: 2 MG/DL (ref ?–2)
WBC #/AREA URNS HPF: 2 /HPF (ref 0–6)

## 2021-05-17 PROCEDURE — 84460 ALANINE AMINO (ALT) (SGPT): CPT

## 2021-05-17 PROCEDURE — 85025 COMPLETE CBC W/AUTO DIFF WBC: CPT

## 2021-05-17 PROCEDURE — 86762 RUBELLA ANTIBODY: CPT

## 2021-05-17 PROCEDURE — 86900 BLOOD TYPING SEROLOGIC ABO: CPT

## 2021-05-17 PROCEDURE — 84450 TRANSFERASE (AST) (SGOT): CPT

## 2021-05-17 PROCEDURE — 82962 GLUCOSE BLOOD TEST: CPT

## 2021-05-17 PROCEDURE — 99211 OFF/OP EST MAY X REQ PHY/QHP: CPT

## 2021-05-17 PROCEDURE — 84550 ASSAY OF BLOOD/URIC ACID: CPT

## 2021-05-17 PROCEDURE — 86592 SYPHILIS TEST NON-TREP QUAL: CPT

## 2021-05-17 PROCEDURE — 85018 HEMOGLOBIN: CPT

## 2021-05-17 PROCEDURE — 36415 COLL VENOUS BLD VENIPUNCTURE: CPT

## 2021-05-17 PROCEDURE — 85014 HEMATOCRIT: CPT

## 2021-05-17 PROCEDURE — 83615 LACTATE (LD) (LDH) ENZYME: CPT

## 2021-05-17 PROCEDURE — G0463 HOSPITAL OUTPT CLINIC VISIT: HCPCS

## 2021-05-17 PROCEDURE — 86850 RBC ANTIBODY SCREEN: CPT

## 2021-05-17 PROCEDURE — 83735 ASSAY OF MAGNESIUM: CPT

## 2021-05-17 PROCEDURE — 86706 HEP B SURFACE ANTIBODY: CPT

## 2021-05-17 PROCEDURE — 86901 BLOOD TYPING SEROLOGIC RH(D): CPT

## 2021-05-17 PROCEDURE — 81001 URINALYSIS AUTO W/SCOPE: CPT

## 2021-05-17 PROCEDURE — 82565 ASSAY OF CREATININE: CPT

## 2021-05-17 PROCEDURE — U0003 INFECTIOUS AGENT DETECTION BY NUCLEIC ACID (DNA OR RNA); SEVERE ACUTE RESPIRATORY SYNDROME CORONAVIRUS 2 (SARS-COV-2) (CORONAVIRUS DISEASE [COVID-19]), AMPLIFIED PROBE TECHNIQUE, MAKING USE OF HIGH THROUGHPUT TECHNOLOGIES AS DESCRIBED BY CMS-2020-01-R: HCPCS

## 2021-05-17 RX ADMIN — CETIRIZINE HYDROCHLORIDE SCH MG: 10 TABLET, FILM COATED ORAL at 23:18

## 2021-05-17 RX ADMIN — SODIUM CHLORIDE, SODIUM LACTATE, POTASSIUM CHLORIDE, AND CALCIUM CHLORIDE SCH MLS/HR: .6; .31; .03; .02 INJECTION, SOLUTION INTRAVENOUS at 21:21

## 2021-05-17 RX ADMIN — INSULIN HUMAN SCH UNITS: 100 INJECTION, SOLUTION PARENTERAL at 21:27

## 2021-05-18 RX ADMIN — SODIUM CHLORIDE, SODIUM LACTATE, POTASSIUM CHLORIDE, AND CALCIUM CHLORIDE SCH MLS/HR: .6; .31; .03; .02 INJECTION, SOLUTION INTRAVENOUS at 05:40

## 2021-05-18 RX ADMIN — INSULIN HUMAN SCH UNITS: 100 INJECTION, SOLUTION PARENTERAL at 17:30

## 2021-05-18 RX ADMIN — CETIRIZINE HYDROCHLORIDE SCH MG: 10 TABLET, FILM COATED ORAL at 10:08

## 2021-05-18 RX ADMIN — SALINE NASAL SPRAY SCH: 1.5 SOLUTION NASAL at 14:00

## 2021-05-18 RX ADMIN — INSULIN HUMAN SCH UNITS: 100 INJECTION, SOLUTION PARENTERAL at 06:08

## 2021-05-18 RX ADMIN — SALINE NASAL SPRAY SCH SPRAY: 1.5 SOLUTION NASAL at 18:15

## 2021-05-18 RX ADMIN — INSULIN HUMAN SCH: 100 INJECTION, SOLUTION PARENTERAL at 22:19

## 2021-05-18 RX ADMIN — SALINE NASAL SPRAY SCH SPRAY: 1.5 SOLUTION NASAL at 10:09

## 2021-05-18 RX ADMIN — INSULIN HUMAN SCH UNITS: 100 INJECTION, SOLUTION PARENTERAL at 10:13

## 2021-05-18 RX ADMIN — SALINE NASAL SPRAY SCH SPRAY: 1.5 SOLUTION NASAL at 22:24

## 2021-05-18 RX ADMIN — INSULIN HUMAN SCH UNITS: 100 INJECTION, SOLUTION PARENTERAL at 00:32

## 2021-05-18 NOTE — HISTORY AND PHYSICAL REPORT
History of Present Illness


Date of examination: 21


Date of admission: 


21 19:01





Chief complaint: 





sent from [facility] for delivery 


History of present illness: 





Pt is a 30 year old -American female  KACEY 21 at 36w6d who 

presents from [facility] clinic secondary to /93 and headache for delivery per 

[facility] note that accompanies the patient. She has had prenatal care at [place] 

[facility] since 11 wks complicated by morbid obesity, chronic hypertension 

on labetalol, insulin dependent diabetes mellitus on Levemir 20 units QHS, h/o 

preeclampsia in previous pregnancy, genital herpes without lesion or prodrome, 

prior  section, GBS bacteriuria, undesired fertility, depression on 

Zoloft. 





Past History


Past Medical History: hypertension, diabetes, other (morbid obesity )


Past Surgical History:  section


GYN History: herpes


Family/Genetic History: heart disease


Social history: no significant social history





- Obstetrical History


Expected Date of Delivery: 21


Actual Gestation: 36 Week(s) 6 Day(s) 


: 3


Para: 1


Hx # Term Pregnancies: 1


Number of  Pregnancies: 0


Spontaneous Abortions: 1


Induced : 0


Number of Living Children: 1





Medications and Allergies


                                    Allergies











Allergy/AdvReac Type Severity Reaction Status Date / Time


 


No Known Allergies Allergy   Verified 19 22:48











                                Home Medications











 Medication  Instructions  Recorded  Confirmed  Last Taken  Type


 


Labetalol 100mg  mg PO DAILY 21 08:30 History


 


Levemir VIAL 20 units SUB-Q QHS 21 22:00 History


 


Prenatal One Daily Tablet 1 tab PO DAILY 21 10:00 

History


 


Zoloft 25 mg PO DAILY 21 History











Active Meds: 


Active Medications





Citric Acid/Sodium Citrate (Bicitra Oral Liqd 30ml)  30 ml PO ONCE ONE


   Stop: 21 20:35


Famotidine (Famotidine 20 Mg/2 Ml Inj)  20 mg IV ONCE ONE


   Stop: 21 20:35


Lactated Ringer's (Lactated Ringers)  1,000 mls @ 2,250 mls/hr IV PREOP BRISEIDA


   Stop: 21 21:12


Oxytocin/Sodium Chloride (Pitocin/Ns 30 Unit/500ml)  30 units in 500 mls @ 0 

mls/hr IV TITR BRISEIDA; Protocol


Cefazolin Sodium 3 gm/ Sodium (Chloride)  100 mls @ 100 mls/30 min IV PREOP NR; 

Protocol


Metoclopramide HCl (Metoclopramide 10 Mg/2 Ml Inj)  10 mg IV ONCE ONE


   Stop: 21 20:35











Review of Systems


All systems: negative





- Vital Signs


Vital signs: 


                                   Vital Signs











Temp Resp BP


 


 97.9 F   16   147/87 


 


 21 20:21  21 20:21  21 20:21








                                        











Temp Pulse Resp BP Pulse Ox


 


 97.9 F   87   16   147/87    


 


 21 20:21  21 20:22  21 20:21  21 20:22   














- Physical Exam


Abdomen: Positive: soft (obese, gravid )


Uterus: Positive: enlarged (gravid )


Extremities: Positive: normal





- Obstetrical


FHR: auscultation normal


Uterine Contraction Monitor Mode: External


Uterine Contraction Pattern: Absent


Uterine Tone Measurement Phase: Resting





Results


Result Diagrams: 


                                 21 20:25





                                 21 20:25


All other labs normal.








Assessment and Plan





A: IUP at 36w6d


    Chronic Hypertension on Labetalol 


    Insulin Dependent Diabetes Mellitus on Levemir 20 units QHS 


    Prior  section x 1 


    Morbid Obesity BMI 47


    H/o preeclampsia in previous pregnancy


    Genital herpes without lesion or prodrome


    GBS positive 


    Undesired fertility


    Depression on Zoloft, initiated on May 3, 2021 





P: Admit to labor and delivery 


    PIH panel, routine admission labs


    Prepare for  delivery

## 2021-05-18 NOTE — OPERATIVE REPORT
Operative Report


Operative Report: 


Date of surgery: May 18, 2021


 


Preoperative diagnosis: Pregnancy at 36.6 weeks; severe preeclampsia; previous 

 delivery; morbid obesity


 


Postoperative diagnosis: Same as above


 


Procedure: Repeat low transverse  delivery


 


Surgeon: Sana Miller M.D.


 


Anesthesia: Regional


 


Estimated blood loss: 500 mL





IV fluids: 1000 mL


 


Findings: Liveborn female infant with Apgars of 8 and 9 weight 5 pounds 9 ounces


 


Indications: 30-year-old -0-1-1 at 36 with 6 weeks being delivered for abdulaziz

re preeclampsia.  The patient has a history of prior  delivery.  The 

patient has declined tubal ligation.


 


Procedure:


     The patient was taken to the operating room and given regional anesthesia 

without complication.  She was prepped and draped in a normal sterile fashion.  

A Pfannenstiel skin incision was made down to layer the fascia which was nicked 

in the midline 


 


extended laterally with the Bovie cautery.  The superior aspect of the rectus 

fascia was grasped with Torrance clamps x2 and the rectus muscles  off 

sharply.  This was done in inferior fashion as well.  The rectus muscle 

 midline and peritoneum 


 


entered bluntly.  An Martir retractor was then inserted.  A bladder blade was 

placed.  The vesicouterine peritoneum was then entered sharply with Metzenbaum 

scissors.  A bladder flap was created digitally.  A low transverse uterine 

incision was then made and 


 


extended digitally.  There was clear fluid  upon entry into the uterine cavity. 

The fetal head was delivered through the incision with fundal pressure.  The 

cord was clamped and cut x2 and infant was passed off to pediatrics.  The 

placenta was then manually 


 


extracted.  The uterus was then exteriorized and cleared of clots and debris.  

The uterine incision was then closed in a running locked fashion with 0 Vicryl 

additional imbricating stitch was applied for 2 layer closure. 


 


  The posterior cul-de-sac was then copiously irrigated.  The uterus was 

replaced back into the abdomen and pelvis were the gutters were then irrigated. 

The Martir retractor was then removed.  The peritoneum was then reapproximated 

with 3-0 Vicryl 


 


incorporating the rectus muscle.  The fascia was then closed with 0 Vicryl in a 

running fashion.  The skin was then reapproximated with 3-0 Monocryl on a Yayo 

needle subcuticular fashion.  Steri-Strips to place across the incision and a 

Crede procedures 


 


performed at the end of the surgery.  A pressure dressing was applied to the 

incision.  The surgery productive of a liveborn female infant with Apgars of 8 

and 9 weight 5 pounds 9 ounces.  The patient was taken to the recovery room in 

stable condition.  All sponge laps and needle 


 


counts correct x2.

## 2021-05-18 NOTE — PROGRESS NOTE
Spinal Anesthesia Block





- Spinal Anesthesia Block


Start Time: 12:45


Stop Time: 13:07


Performed by:: MEGHANN GOMEZ


Procedure: 





Spinal anesthesia block is being performed for [C/S].  H&P, labs have been 

reviewed.  Patient's questions and concerns have been answered.  Informed 

consent has been performed.  Timeout has was performed.  Patient in sitting 

position on side of bed.  Sterile prep and drape was performed.  3 mL 1% 

lidocaine skin wheal at L [3]-L [4].  Needle introducer advanced.  25-gauge 

spinal needle advanced without success.  3 mL 1% lidocaine skin wheal at L [2]-L

 [3].  Needle introducer advanced.  25-gauge spinal needle advanced, [+] CSF [-]

blood.  [Marcaine 10mg, Precedex 5mcg, and Duramorph 0.2mg] Spinal dose was 

given.  All needles removed.  Patient tolerated procedure well.

## 2021-05-18 NOTE — PROCEDURE NOTE
OB Delivery Note





- Delivery


Date of Delivery: 21


Surgeon: SOHAM SPIVEY


Estimated blood loss: 500cc





-  Section


Preop diagnosis: repeat , other


Postop diagnosis: same


 section procedure:  section, repeat low transverse


Disposition: PACU





- Infant


  ** A


Apgar at 1 minute: 8


Apgar at 5 minutes: 9


Infant Gender: Female (Weight 5 pounds 9 ounces)

## 2021-05-18 NOTE — ANESTHESIA CONSULTATION
Anesthesia Consult and Med Hx


Date of service: 05/18/21





- Airway


Anesthetic Teeth Evaluation: Good


ROM Head & Neck: Adequate


Mental/Hyoid Distance: Adequate


Mallampati Class: Class IV


Intubation Access Assessment: Possibly Difficult





- Pulmonary Exam


CTA: Yes





- Cardiac Exam


Cardiac Exam: RRR





- Pre-Operative Health Status


ASA Pre-Surgery Classification: ASA3


Proposed Anesthetic Plan: Spinal





- Pulmonary


Hx Smoking: No


Hx Asthma: No


COPD: No


Hx Pneumonia: Yes (2017)


Hx Sleep Apnea: No





- Cardiovascular System


Hx Hypertension: Yes


Hx Heart Attack/AMI: No


Hx Angina: No





- Central Nervous System


Hx Seizures: No


Hx Psychiatric Problems: Yes (DEPRESSION)





- Gastrointestinal


Hx Gastroesophageal Reflux Disease: No





- Endocrine


Hx Renal Disease: No


Hx End Stage Renal Disease: No


Hx Liver Disease: No


Hx Insulin Dependent Diabetes: Yes


Hx Hypothyroidism: No


Hx Hyperthyroidism: No





- Hematic


Hx Anemia: No


Hx Sickle Cell Disease: No





- Other Systems


Hx Alcohol Use: No


Hx Obesity: Yes





- Additional Comments


Anesthesia Medical History Comments: Prior C/S

## 2021-05-18 NOTE — ANESTHESIA DAY OF SURGERY
Anesthesia Day of Surgery





- Day of Surgery


Patient Examined: Yes


Patient H&P Reviewed: Yes


Patient is NPO: Yes


Beta Blockers: No


Cardiac Clearance: No


Pulmonary Clearance: No


Beka's Test: N/A

## 2021-05-19 LAB
HCT VFR BLD CALC: 35.2 % (ref 30.3–42.9)
HGB BLD-MCNC: 12.1 GM/DL (ref 10.1–14.3)

## 2021-05-19 RX ADMIN — CETIRIZINE HYDROCHLORIDE SCH MG: 10 TABLET, FILM COATED ORAL at 10:23

## 2021-05-19 RX ADMIN — KETOROLAC TROMETHAMINE PRN MG: 30 INJECTION, SOLUTION INTRAMUSCULAR at 00:38

## 2021-05-19 RX ADMIN — OXYCODONE AND ACETAMINOPHEN PRN TAB: 5; 325 TABLET ORAL at 20:32

## 2021-05-19 RX ADMIN — INSULIN HUMAN SCH UNITS: 100 INJECTION, SOLUTION PARENTERAL at 10:06

## 2021-05-19 RX ADMIN — KETOROLAC TROMETHAMINE PRN MG: 30 INJECTION, SOLUTION INTRAMUSCULAR at 16:24

## 2021-05-19 RX ADMIN — INSULIN HUMAN SCH UNITS: 100 INJECTION, SOLUTION PARENTERAL at 04:40

## 2021-05-19 RX ADMIN — SALINE NASAL SPRAY SCH SPRAY: 1.5 SOLUTION NASAL at 10:23

## 2021-05-19 RX ADMIN — INSULIN HUMAN SCH UNITS: 100 INJECTION, SOLUTION PARENTERAL at 16:23

## 2021-05-19 RX ADMIN — SALINE NASAL SPRAY SCH SPRAY: 1.5 SOLUTION NASAL at 22:38

## 2021-05-19 RX ADMIN — INSULIN HUMAN SCH UNITS: 100 INJECTION, SOLUTION PARENTERAL at 22:38

## 2021-05-19 RX ADMIN — MORPHINE SULFATE PRN MG: 4 INJECTION, SOLUTION INTRAMUSCULAR; INTRAVENOUS at 05:16

## 2021-05-19 RX ADMIN — SALINE NASAL SPRAY SCH SPRAY: 1.5 SOLUTION NASAL at 14:20

## 2021-05-19 NOTE — PROGRESS NOTE
Assessment and Plan





- Patient Problems


(1) Status post repeat low transverse  section


Current Visit: Yes   Status: Acute   


Plan to address problem: 


Continue Magneium until 1430


 Resume diet after Mag off


 Keep dressing clean and dry, remove on POD #2


 Anticipate d/c home in 24-48 hrs


 








(2) Hypertension


Current Visit: Yes   Status: Acute   


Plan to address problem: 


Continue to monitor B/P


 Continue B/P meds as ordered








(3) Insulin dependent diabetes mellitus


Current Visit: Yes   Status: Acute   





Subjective





- Subjective


Date of service: 21


Principal diagnosis: S/P repeat C/S; POD#1


Interval history: 





Pt is a 30 year old -American female  KACEY 21 at 36w6d who 

presents from [facility] clinic secondary to /93 and headache for delivery per 

[facility] note that accompanies the patient. She has had prenatal care at [facility] since 11 wks complicated by morbid obesity, chronic hypertension 

on labetalol, insulin dependent diabetes mellitus on Levemir 20 units QHS, h/o 

preeclampsia in previous pregnancy, genital herpes without lesion or prodrome, 

prior  section, GBS bacteriuria, undesired fertility, depression on 

Zoloft. 





Patient reports: voiding normally (ramirez in place), pain well controlled (with 

medications), flatus, no bowel movement


[place]: doing well (in nursery)





Objective





- Vital Signs


Latest vital signs: 


                                   Vital Signs











  Temp Pulse Resp BP


 


 21 12:27   114 H   138/75


 


 21 08:10   104 H   128/81


 


 21 08:00  98 F   18 


 


 21 05:37   115 H   115/73


 


 21 05:22   99 H   119/75


 


 21 05:16    18 


 


 21 05:07   100 H   118/67


 


 21 04:52   100 H   118/76


 


 21 04:37   94 H   114/63


 


 21 04:22   94 H   115/58


 


 21 04:07   99 H   115/56


 


 21 03:52   86   104/59


 


 21 03:37   93 H   103/61


 


 21 03:22   101 H   96/53


 


 21 03:07   90   116/66


 


 21 02:52   90   115/65


 


 0519 02:37   91 H   115/60


 


 0519 02:22   90   111/58


 


 0519 02:07   107 H   114/57


 


 05 01:52   92 H   114/62


 


 0519 01:37   94 H   116/64


 


 05 01:22   88   111/61


 


 0519 01:08    18 


 


 05 01:07   104 H   109/56


 


 0519 00:52   95 H   113/59


 


 0519 00:38    18 


 


 21 00:37   97 H   110/60


 


 0519 00:22   88   114/62


 


 0519 00:07   91 H   114/57


 


 0518 23:52   100 H   110/55


 


 0518/21 23:37   100 H   109/54


 


 0518/21 23:22   106 H   118/53


 


 0518/21 23:07   102 H   108/56


 


 0518/21 22:52   96 H   115/58


 


 051821 22:37   97 H   113/58


 


 0518/21 22:22   100 H   126/62


 


 0518/21 22:07   106 H   118/65


 


 05/18/21 21:52   88   120/69


 


 05/18/21 21:37   86   117/65


 


 05/18/21 21:22   90   118/67


 


 05/18/21 21:07   89   119/68


 


 05/18/21 20:52   93 H   100/57


 


 05/18/21 20:37   96 H   109/61


 


 05/18/21 20:22   98 H   106/62


 


 0518/21 20:07   93 H   113/64


 


 05/18/21 19:52   100 H   121/69


 


 05/18/21 19:37   100 H   123/80


 


 05/18/21 19:22   112 H   123/84


 


 05/18/21 19:07   88   108/60


 


 05/18/21 18:52   100 H   109/56


 


 05/18/21 18:37   102 H   101/52


 


 05/18/21 18:22   100 H   105/52


 


 05/18/21 18:08   106 H   104/57


 


 05/18/21 17:37   104 H   101/55


 


 05/18/21 17:22   89   103/62


 


 05/18/21 17:07   114 H   90/42


 


 21 16:52   103 H   103/58


 


 21 16:37   120 H   99/56


 


 21 16:22   99 H   106/56


 


 21 16:07   87   104/56


 


 21 16:00  97.8 F   18 


 


 21 15:40   111 H  18  105/43


 


 21 15:30   90  18  101/55


 


 21 15:15  97.8 F  107 H  18  87/45


 


 21 15:00   103 H  18  103/49


 


 21 14:56   102 H  18  88/34


 


 21 14:45   95 H  18  104/58


 


 21 14:40   104 H  18  101/57


 


 21 14:35   111 H  18  98/42


 


 21 14:30   96 H  16  113/71


 


 21 14:26  97.4 F L  100 H  16  105/65








                                Intake and Output











 21





 23:59 07:59 15:59


 


Intake Total 739.167  


 


Output Total 150 500 300


 


Balance 589.167 -500 -300


 


Intake:   


 


  .167  


 


    MAGNESIUM SULFATE 40GM/ 239.167  





    1000ML 40 gm In 1,000 ml   





    @ 1 GM/HR 25 mls/hr IV AS   





    DIRECT BRISEIDA Rx#:[id number]   


 


  Oral 500  


 


Output:   


 


  Urine 150 500 300


 


    Indwelling Catheter 150 500 


 


    Void   300


 


Other:   


 


  Total, Intake Amount 500  


 


  Total, Output Amount 150 500 300














- Exam


Breasts: Present: normal


Cardiovascular: Present: Regular rate


Lungs: Present: Normal air movement


Abdomen: Present: soft, tenderness


Uterus: Present: firm, fundal height below umbilicus (U1)


Incision: Present: dressed





- Labs


Labs: 


                              Abnormal lab results











  21 Range/Units





  16:06 17:28 22:17 


 


POC Glucose   169 H  147 H  ()  mg/dL


 


Magnesium  2.60 H    (1.7-2.3)  mg/dL














  21 Range/Units





  23:34 04:14 04:30 


 


POC Glucose    181 H  ()  mg/dL


 


Magnesium  2.90 H  3.10 H   (1.7-2.3)  mg/dL














  21 Range/Units





  09:35 


 


POC Glucose   ()  mg/dL


 


Magnesium  2.90 H  (1.7-2.3)  mg/dL

## 2021-05-20 RX ADMIN — MORPHINE SULFATE PRN MG: 4 INJECTION, SOLUTION INTRAMUSCULAR; INTRAVENOUS at 04:36

## 2021-05-20 RX ADMIN — INSULIN HUMAN SCH UNITS: 100 INJECTION, SOLUTION PARENTERAL at 04:49

## 2021-05-20 RX ADMIN — OXYCODONE AND ACETAMINOPHEN PRN TAB: 5; 325 TABLET ORAL at 06:38

## 2021-05-20 RX ADMIN — SALINE NASAL SPRAY SCH SPRAY: 1.5 SOLUTION NASAL at 09:36

## 2021-05-20 RX ADMIN — SALINE NASAL SPRAY SCH SPRAY: 1.5 SOLUTION NASAL at 14:07

## 2021-05-20 RX ADMIN — OXYCODONE AND ACETAMINOPHEN PRN TAB: 5; 325 TABLET ORAL at 20:15

## 2021-05-20 RX ADMIN — INSULIN HUMAN SCH UNITS: 100 INJECTION, SOLUTION PARENTERAL at 09:33

## 2021-05-20 RX ADMIN — CETIRIZINE HYDROCHLORIDE SCH MG: 10 TABLET, FILM COATED ORAL at 09:32

## 2021-05-20 RX ADMIN — OXYCODONE AND ACETAMINOPHEN PRN TAB: 5; 325 TABLET ORAL at 09:31

## 2021-05-20 RX ADMIN — OXYCODONE AND ACETAMINOPHEN PRN TAB: 5; 325 TABLET ORAL at 14:08

## 2021-05-20 RX ADMIN — INSULIN HUMAN SCH UNITS: 100 INJECTION, SOLUTION PARENTERAL at 22:40

## 2021-05-20 NOTE — CONSULTATION
History of Present Illness





- Reason for Consult


Consult date: 05/20/21


Reason for consult: MHE


Requesting physician: MATIAS HAHN





- History of Present Psychiatric Illness


PSYCH HPI


Patient is a 30-year-old single, currently unemployed but on SSI -

American female who resides with self and has past psychiatric history of d

epression and past medical history of MDD admitted to OB/GYN for child delivery 

with subsequent mental health evaluation placed due to postpartum depression 

score assessment.  


Patient states she is depressed but its not that bad, mostly because she has no 

support from her baby father  or whatsoever and she is left with caring for all 

these kids herself as she has more kids at home to take care of. Patient reports

having her moms support and getting support from other folks but it makes her 

sad knowing the primary who should be supporting is not there to support. SHe 

states she sees outpt psychiatrist often, denies SI, HI or AVH. 





PAST PSYCHIATRIC HISTORY


Diagnoses: Depression


Suicide attempts or Self-harm behavior: None reported


Prior psychiatric hospitalizations: None reported


Substance Abuse history: None reported


Previous psychiatric medications tried: Zoloft


Outpatient treatment: yes





PAST MEDICAL HISTORY: DM





Family Psychiatric History: None reported or documented





SOCIAL HISTORY


Marital Status: Single


Living Arrangements: rent with other kids


Employment Status: Unemployed


Access to guns/weapons: None report


Education: High school diploma


History of Abuse: None reported


Legal History: None report





REVIEW OF SYSTEMS


Constitutional: Negative for weight loss


ENT: Negative for stridor


Respiratory: Negative for cough or hemoptysis


All other systems reviewed and are negative


 


MENTAL STATUS EXAMINATION


General Appearance and Behavior: Age appropriate, good hygiene, wearing 

appropriate clothes, good eye contact, cooperative polite with questioning.


Cooperation: Participating/engaged


Psychomotor Behavior:  unremarkable and within normal limits


Mood: Good


Affect and affective range: congruent with mood


Thought Process: Fluent/Logical, 


Thought Content: Within reality,


Speech: Normal volume, Regular rate and rhythm, 


Intellectual Functioning: Average


Suicidal Ideation: Denies SI


Homicidal Ideation: Denies HI


Impulse Control: Unimpaired


Insight and Judgment: Normal insight and judgment,


Memory: Normal, 


Attention: Normal,


Orientation: Alert, oriented,





 Assessment and Plan 





- Psychiatric problem


(1) Encounter for screening examination for mental health and behavioral 

disorders


Current Visit: Yes   Status: Acute   


Z13.30








Treatment Plan





MEDICATIONS: 


Risks, benefits and alternatives of medications discussed with the patient, 

questions answered and consent obtained from patient.


PSYCHOTHERAPY: Supportive psychotherapy provided


MEDICAL: Per primary team


DELIRIUM PRECAUTIONS: Please re-orient patient frequently, keep lights on during

the day, and minimize benzodiazepines and opiates as these medications could 

worsen patient's confusion.


SAFETY SITTER:


DISPOSITION:  Do Not Recommend acute inpatient psychiatric hospitalization at 

this time. Case discussed with Dr. Oseguera who agrees with current disposition


LEGAL STATUS: Voluntary


FOLLOW-UP: Will sign off


Thank you for the consult.  Please contact with any questions and/or concerns.


   





Medications and Allergies


                                    Allergies











Allergy/AdvReac Type Severity Reaction Status Date / Time


 


No Known Allergies Allergy   Verified 03/28/19 22:48











                                Home Medications











 Medication  Instructions  Recorded  Confirmed  Last Taken  Type


 


Labetalol 100mg  mg PO DAILY 05/17/21 05/17/21 05/17/21 08:30 History


 


Levemir VIAL 20 units SUB-Q QHS 05/17/21 05/17/21 05/16/21 22:00 History


 


Prenatal One Daily Tablet 1 tab PO DAILY 05/17/21 05/17/21 05/14/21 10:00 

History


 


Zoloft 25 mg PO DAILY 05/17/21 05/17/21 05/16/21 History











Active Meds: 


Active Medications





Acetaminophen (Acetaminophen 325 Mg Tab)  650 mg PO Q4H PRN


   PRN Reason: Fever >100.5/HA


Cetirizine HCl (Cetirizine 10 Mg Tab)  10 mg PO QDAY BRISEIDA


   Last Admin: 05/20/21 09:32 Dose:  10 mg


   Documented by: 


Dextrose (Dextrose 50% In Water (25gm) 50 Ml Syringe)  50 ml IV Q30MIN PRN; 

Protocol


   PRN Reason: Hypoglycemia


Diphenhydramine HCl (Diphenhydramine 50 Mg/Ml Vial)  12.5 mg IV Q2H PRN


   PRN Reason: Itching


   Last Admin: 05/19/21 05:04 Dose:  12.5 mg


   Documented by: 


Hydralazine HCl (Hydralazine 20 Mg/1 Ml Inj)  5 mg IV Q30MIN PRN


   PRN Reason: Hypertension


   Last Admin: 05/18/21 10:40 Dose:  5 mg


   Documented by: 


Hydromorphone HCl (Hydromorphone 1 Mg/1 Ml Inj)  0.5 mg IV Q4H PRN


   PRN Reason: breakthrough pain > 7/10


Lactated Ringer's (Lactated Ringers)  1,000 mls @ 125 mls/hr IV AS DIRECT BRISEIDA


   Last Admin: 05/19/21 02:13 Dose:  125 mls/hr


   Documented by: 


Magnesium Sulfate (Magnesium Sulfate 40gm/1000ml)  40 gm in 1,000 mls @ 25 

mls/hr IV AS DIRECT BRISEIDA


   Last Infusion: 05/18/21 17:48 Dose:  Infused


   Documented by: 


Oxytocin/Sodium Chloride (Pitocin/Ns 30 Unit/500ml)  30 units in 500 mls @ 40 

mls/hr IV TITR BRISEIDA; Protocol


Dextrose/Lactated Ringer's (D5lr)  1,000 mls @ 125 mls/hr IV AS DIRECT BRISEIDA


Ibuprofen (Ibuprofen 600 Mg Tab)  600 mg PO Q6H PRN


   PRN Reason: Pain, Mild (1-3)


   Last Admin: 05/19/21 10:22 Dose:  600 mg


   Documented by: 


Insulin Human Regular (Insulin Regular, Human 100 Units/1 Ml)  0 units SUB-Q Q6H

BRISEIDA; Protocol


   Last Admin: 05/20/21 09:33 Dose:  3 units


   Documented by: 


Ketorolac Tromethamine (Ketorolac 30 Mg/1 Ml Inj)  30 mg IV Q6H PRN


   PRN Reason: Pain, Moderate (4-6)


   Stop: 05/23/21 12:59


   Last Admin: 05/19/21 16:24 Dose:  30 mg


   Documented by: 


Morphine Sulfate (Morphine 4 Mg/1 Ml Inj)  4 mg IV Q4H PRN


   PRN Reason: Pain , Severe (7-10)


   Last Admin: 05/20/21 04:36 Dose:  4 mg


   Documented by: 


Multi-Ingredient Ointment (Lanolin/Zinc/Dimethicone (Lansinoh) 7 Gm)  1 applic 

TP PRN PRN


   PRN Reason: dryness/cracking


Nalbuphine HCl (Nalbuphine 10 Mg/1 Ml Inj)  2.5 mg IV Q2H PRN


   PRN Reason: Itching


Naloxone HCl (Naloxone 0.4 Mg/1 Ml Inj)  0.2 mg IV Q2MIN PRN


   PRN Reason: Res Rate </= 8 or 02 SAT < 92%


Naloxone HCl (Naloxone 0.4 Mg/1 Ml Inj)  0.1 mg IV Q2MIN PRN


   PRN Reason: Res Rate </= 8 or 02 SAT < 92%


Ondansetron HCl (Ondansetron 4 Mg/2 Ml Inj)  4 mg IV Q8H PRN


   PRN Reason: Nausea And Vomiting


Oxycodone/Acetaminophen (Oxycodone /Acetaminophen 5-325mg Tab)  2 tab PO Q4H PRN


   PRN Reason: Pain, Moderate (4-6)


   Last Admin: 05/20/21 09:31 Dose:  2 tab


   Documented by: 


Promethazine HCl (Promethazine 25 Mg Tab)  25 mg PO Q6H PRN


   PRN Reason: Nausea And Vomiting


Promethazine HCl (Promethazine 25 Mg Rect Supp)  25 mg CO Q6H PRN


   PRN Reason: Nausea And Vomiting


Sodium Chloride (Sodium Chloride Nasal Spray 44ml)  2 spray NS QID BRISEIDA


   Last Admin: 05/20/21 09:36 Dose:  2 spray


   Documented by: 


Sodium Chloride (Sodium Chloride 0.9% 10 Ml Flush Syringe)  10 ml IV PRN PRN


   PRN Reason: flush


Witch Hazel/Glycerin (Witch Hazel/ Glycerin Pad)  1 each TP PRN PRN


   PRN Reason: Hemorrhoids/cleansing/soothing











Mental Status Exam





- Vital signs


                                Last Vital Signs











Temp  98.2 F   05/20/21 01:18


 


Pulse  96 H  05/20/21 05:22


 


Resp  20   05/20/21 06:38


 


BP  143/84   05/20/21 05:22


 


Pulse Ox  98   05/20/21 05:22














Results


Result Diagrams: 


                                 05/19/21 04:14





                                 05/17/21 20:25


                              Abnormal lab results











  05/19/21 05/19/21 05/19/21 Range/Units





  08:13 09:35 14:05 


 


POC Glucose  190 H    ()  mg/dL


 


Magnesium   2.90 H  2.80 H  (1.7-2.3)  mg/dL














  05/19/21 05/19/21 05/20/21 Range/Units





  15:32 22:25 04:40 


 


POC Glucose  154 H  223 H  161 H  ()  mg/dL


 


Magnesium     (1.7-2.3)  mg/dL














  05/20/21 Range/Units





  08:46 


 


POC Glucose  203 H  ()  mg/dL


 


Magnesium   (1.7-2.3)  mg/dL








All other labs normal.

## 2021-05-20 NOTE — PROGRESS NOTE
Assessment and Plan





- Patient Problems


(1) Status post repeat low transverse  section


Current Visit: Yes   Status: Acute   


Plan to address problem: 


Continue Magneium until 1430


 Resume diet after Mag off


 Keep incision clean and dry


 Anticipate d/c home in 24-48 hrs


 








(2) Hypertension


Current Visit: Yes   Status: Acute   





(3) Insulin dependent diabetes mellitus


Current Visit: Yes   Status: Acute   





Subjective





- Subjective


Date of service: 21


Principal diagnosis: S/P repeat C/S; POD#2


Interval history: 





Pt is a 30 year old -American female  KACEY 21 at 36w6d who 

presents from [facility] clinic secondary to /93 and headache for delivery per 

[facility] note that accompanies the patient. She has had prenatal care at [facility] since 11 wks complicated by morbid obesity, chronic hypertension 

on labetalol, insulin dependent diabetes mellitus on Levemir 20 units QHS, h/o 

preeclampsia in previous pregnancy, genital herpes without lesion or prodrome, 

prior  section, GBS bacteriuria, undesired fertility, depression on 

Zoloft. 





Patient reports: appetite normal, voiding normally, pain well controlled (with 

medications), flatus, bowel movement, ambulating normally


[place]: doing well, bottle feeding (and breastfeeding)





Objective





- Vital Signs


Latest vital signs: 


                                   Vital Signs











  Temp Pulse Resp BP Pulse Ox


 


 21 12:31  98.2 F  124 H  18  148/88  91


 


 21 08:48  98.8 F  107 H  18  116/65  97


 


 21 06:38    20  


 


 21 05:22   96 H   143/84  98


 


 21 05:06    20  


 


 21 04:36    20  


 


 21 01:18  98.2 F  96 H  20  117/78  99


 


 21 20:32    20  


 


 21 20:31  97.6 F  107 H  20  121/63  96








                                Intake and Output











 21





 07:59 15:59 23:59


 


Intake Total 240  


 


Output Total 600  


 


Balance -360  


 


Intake:   


 


  Oral 240  


 


Output:   


 


  Urine 600  


 


    Void 600  


 


Other:   


 


  Total, Intake Amount 240  


 


  Total, Output Amount 600  


 


  # Voids   


 


    Void 3  














- Exam


Breasts: Present: normal


Cardiovascular: Present: Regular rate


Lungs: Present: Normal air movement


Abdomen: Present: soft, tenderness


Uterus: Present: firm, fundal height below umbilicus


Extremities: Present: edema


Incision: Present: dry, intact (steri-stips intact, no bleeding or drainage 

noted, abdominal binder in place)





- Labs


Labs: 


                              Abnormal lab results











  21 Range/Units





  08:13 15:32 22:25 


 


POC Glucose  190 H  154 H  223 H  ()  mg/dL














  21 Range/Units





  04:40 08:46 12:34 


 


POC Glucose  161 H  203 H  182 H  ()  mg/dL

## 2021-05-21 VITALS — DIASTOLIC BLOOD PRESSURE: 87 MMHG | SYSTOLIC BLOOD PRESSURE: 145 MMHG

## 2021-05-21 RX ADMIN — SALINE NASAL SPRAY SCH SPRAY: 1.5 SOLUTION NASAL at 14:18

## 2021-05-21 RX ADMIN — INSULIN HUMAN SCH UNITS: 100 INJECTION, SOLUTION PARENTERAL at 14:24

## 2021-05-21 RX ADMIN — INSULIN HUMAN SCH: 100 INJECTION, SOLUTION PARENTERAL at 04:16

## 2021-05-21 RX ADMIN — SALINE NASAL SPRAY SCH SPRAY: 1.5 SOLUTION NASAL at 11:08

## 2021-05-21 RX ADMIN — INSULIN HUMAN SCH UNITS: 100 INJECTION, SOLUTION PARENTERAL at 11:14

## 2021-05-21 RX ADMIN — OXYCODONE AND ACETAMINOPHEN PRN TAB: 5; 325 TABLET ORAL at 08:48

## 2021-05-21 RX ADMIN — CETIRIZINE HYDROCHLORIDE SCH MG: 10 TABLET, FILM COATED ORAL at 11:08

## 2021-05-21 NOTE — DISCHARGE SUMMARY
Providers





- Providers


Date of Admission: 


21 19:01





Date of discharge: 21


Attending physician: 


GAYLA GAYTAN





                                        





21 00:51


psychiatry consult [Consult to Mental Health] [CONS] Routine 


   Reason For Exam: edinburgh  depression scale score 12











Primary care physician: 


GAYLA GAYTAN








Hospitalization


Reason for admission: other (chronic hypertension with superimposed preeclampsia

 )


Delivery: 


Procedure:  section, repeat low transverse


Procedure details: 





Please see operative report 


Episiotomy: none


Laceration: none


Incision: intact


Other postpartum procedures: none


Postpartum complications: none


Discharge diagnosis: IUP at term delivered


Gray baby: female


Hospital course: 





Pt was admitted for repeat  section at 36w6d with chronic hypertension 

with superimposed preeclampsia which she tolerated well. She received IV 

magnesium sulfate for 24 hours for seizure prophylaxis. Her postoperative course

 was uncomplicated and she met discharge criteria on POD#3. She will follow up 

in the office in 1 wk for a BP check.


Condition at discharge: Stable


Disposition:  TO HOME OR SELFCARE





- Discharge Diagnoses


(1)  delivery after  section


Status: Acute   





(2) Morbid obesity


Status: Acute   





(3) Pre-eclampsia superimposed on chronic hypertension


Status: Acute   





(4) Status post repeat low transverse  section


Status: Acute   





Plan





- Discharge Medications


Prescriptions: 


labetaloL [Labetalol 200mg TAB] 200 mg PO BID #60 tablet


Ibuprofen [Motrin] 800 mg PO Q8HR PRN #30 tablet


 PRN Reason: Pain, Moderate (4-6)


oxyCODONE /ACETAMINOPHEN [Percocet 5/325] 1 tab PO Q6HR PRN #40 tablet


 PRN Reason: Pain





- Provider Discharge Summary


Activity: routine, no sex for 6 weeks, no heavy lifting 4 weeks, no strenuous 

exercise


Diet: routine


Instructions: routine


Additional instructions: 


[]  Smoking cessation referral if applicable(refer to patient education folder 

for contact #)


[]  Refer to [facility] Booklet








Call your doctor immediately for:


* Fever > 100.5


* Heavy vaginal bleeding ( >1 pad per hour)


* Severe persistent headache


* Shortness of breath


* Reddened, hot, painful area to leg or breast


* Drainage or odor from incision.





* Keep incision clean and dry at all times and follow doctor's instructions 

regarding bathing/showering











- Follow up plan


Follow up: 


GAYLA GAYTAN MD [Primary Care Provider] - 7 Days (please schedule BP check in one

week )

## 2021-05-21 NOTE — PROGRESS NOTE
Assessment and Plan





A: POD#3 s/p repeat  section


    Chronic HTN with Superimposed Preeclampsia s/p magnesium sulfate x 24 hrs 


    Insulin Dependent Diabetes


    Morbid Obesity





P: Routine postpartum care


    Anticipate discharge later today with follow up in 1 week.   





Subjective





- Subjective


Date of service: 21


Principal diagnosis: S/P repeat C/S; POD#3


Interval history: 





Pt has had a bowel movement. No overnight events. Pt is anxious to go home.  


Patient reports: appetite normal, voiding normally, pain well controlled, 

flatus, bowel movement, ambulating normally


: doing well





Objective





- Vital Signs


Latest vital signs: 


                                   Vital Signs











  Temp Pulse Resp BP BP Pulse Ox


 


 21 05:35  98.6 F  93 H  16   151/101 


 


 21 04:34  98.8 F  69  18   140/79 


 


 21 00:44  98.0 F  100 H  18  130/88   95


 


 21 20:48  98.0 F  100 H  18  142/87   98


 


 21 19:10      143/88 


 


 21 17:08  97.9 F  101 H  18  154/104   99


 


 21 12:31  98.2 F  124 H  18  148/88   91


 


 21 08:48  98.8 F  107 H  18  116/65   97








                                Intake and Output











 21





 22:59 06:59 14:59


 


Intake Total 300 200 


 


Balance 300 200 


 


Intake:   


 


  Oral  200 


 


  Intake, Free Water 300  


 


Other:   


 


  Total, Intake Amount  200 


 


  # Voids   


 


    Void 1 1 














- Exam


Breasts: Present: deferred


Abdomen: Present: soft (obese )


Uterus: Present: normal, fundal height below umbilicus


Extremities: Present: edema (trace )





- Labs


Labs: 


                              Abnormal lab results











  21 Range/Units





  04:40 08:46 12:34 


 


POC Glucose  161 H  203 H  182 H  ()  mg/dL














  21 Range/Units





  17:27 22:28 


 


POC Glucose  158 H  209 H  ()  mg/dL